# Patient Record
Sex: FEMALE | Race: WHITE | HISPANIC OR LATINO | Employment: FULL TIME | URBAN - METROPOLITAN AREA
[De-identification: names, ages, dates, MRNs, and addresses within clinical notes are randomized per-mention and may not be internally consistent; named-entity substitution may affect disease eponyms.]

---

## 2017-12-31 ENCOUNTER — HOSPITAL ENCOUNTER (EMERGENCY)
Facility: MEDICAL CENTER | Age: 22
End: 2017-12-31
Attending: EMERGENCY MEDICINE

## 2017-12-31 ENCOUNTER — APPOINTMENT (OUTPATIENT)
Dept: RADIOLOGY | Facility: MEDICAL CENTER | Age: 22
End: 2017-12-31
Attending: EMERGENCY MEDICINE

## 2017-12-31 VITALS
BODY MASS INDEX: 26.02 KG/M2 | HEIGHT: 63 IN | SYSTOLIC BLOOD PRESSURE: 109 MMHG | WEIGHT: 146.83 LBS | DIASTOLIC BLOOD PRESSURE: 74 MMHG | OXYGEN SATURATION: 98 % | TEMPERATURE: 97 F | RESPIRATION RATE: 16 BRPM | HEART RATE: 84 BPM

## 2017-12-31 DIAGNOSIS — O20.0 THREATENED MISCARRIAGE: ICD-10-CM

## 2017-12-31 LAB
ALBUMIN SERPL BCP-MCNC: 4 G/DL (ref 3.2–4.9)
ALBUMIN/GLOB SERPL: 1.3 G/DL
ALP SERPL-CCNC: 39 U/L (ref 30–99)
ALT SERPL-CCNC: 23 U/L (ref 2–50)
ANION GAP SERPL CALC-SCNC: 7 MMOL/L (ref 0–11.9)
AST SERPL-CCNC: 22 U/L (ref 12–45)
B-HCG SERPL-ACNC: ABNORMAL MIU/ML (ref 0–5)
BACTERIA GENITAL QL WET PREP: NORMAL
BASOPHILS # BLD AUTO: 0.3 % (ref 0–1.8)
BASOPHILS # BLD: 0.04 K/UL (ref 0–0.12)
BILIRUB SERPL-MCNC: 0.3 MG/DL (ref 0.1–1.5)
BUN SERPL-MCNC: 10 MG/DL (ref 8–22)
CALCIUM SERPL-MCNC: 9.7 MG/DL (ref 8.5–10.5)
CHLORIDE SERPL-SCNC: 104 MMOL/L (ref 96–112)
CO2 SERPL-SCNC: 23 MMOL/L (ref 20–33)
CREAT SERPL-MCNC: 0.53 MG/DL (ref 0.5–1.4)
EOSINOPHIL # BLD AUTO: 0.25 K/UL (ref 0–0.51)
EOSINOPHIL NFR BLD: 1.7 % (ref 0–6.9)
ERYTHROCYTE [DISTWIDTH] IN BLOOD BY AUTOMATED COUNT: 41.1 FL (ref 35.9–50)
GFR SERPL CREATININE-BSD FRML MDRD: >60 ML/MIN/1.73 M 2
GLOBULIN SER CALC-MCNC: 3.2 G/DL (ref 1.9–3.5)
GLUCOSE SERPL-MCNC: 70 MG/DL (ref 65–99)
HCT VFR BLD AUTO: 34.7 % (ref 37–47)
HGB BLD-MCNC: 11.8 G/DL (ref 12–16)
IMM GRANULOCYTES # BLD AUTO: 0.06 K/UL (ref 0–0.11)
IMM GRANULOCYTES NFR BLD AUTO: 0.4 % (ref 0–0.9)
LYMPHOCYTES # BLD AUTO: 2.05 K/UL (ref 1–4.8)
LYMPHOCYTES NFR BLD: 14 % (ref 22–41)
MCH RBC QN AUTO: 28.1 PG (ref 27–33)
MCHC RBC AUTO-ENTMCNC: 34 G/DL (ref 33.6–35)
MCV RBC AUTO: 82.6 FL (ref 81.4–97.8)
MONOCYTES # BLD AUTO: 0.66 K/UL (ref 0–0.85)
MONOCYTES NFR BLD AUTO: 4.5 % (ref 0–13.4)
NEUTROPHILS # BLD AUTO: 11.6 K/UL (ref 2–7.15)
NEUTROPHILS NFR BLD: 79.1 % (ref 44–72)
NRBC # BLD AUTO: 0 K/UL
NRBC BLD-RTO: 0 /100 WBC
NUMBER OF RH DOSES IND 8505RD: NORMAL
PLATELET # BLD AUTO: 340 K/UL (ref 164–446)
PMV BLD AUTO: 9.3 FL (ref 9–12.9)
POTASSIUM SERPL-SCNC: 3.3 MMOL/L (ref 3.6–5.5)
PROT SERPL-MCNC: 7.2 G/DL (ref 6–8.2)
RBC # BLD AUTO: 4.2 M/UL (ref 4.2–5.4)
RH BLD: NORMAL
SIGNIFICANT IND 70042: NORMAL
SITE SITE: NORMAL
SODIUM SERPL-SCNC: 134 MMOL/L (ref 135–145)
SOURCE SOURCE: NORMAL
WBC # BLD AUTO: 14.7 K/UL (ref 4.8–10.8)

## 2017-12-31 PROCEDURE — 99284 EMERGENCY DEPT VISIT MOD MDM: CPT

## 2017-12-31 PROCEDURE — 84702 CHORIONIC GONADOTROPIN TEST: CPT

## 2017-12-31 PROCEDURE — 76817 TRANSVAGINAL US OBSTETRIC: CPT

## 2017-12-31 PROCEDURE — 87591 N.GONORRHOEAE DNA AMP PROB: CPT

## 2017-12-31 PROCEDURE — 700105 HCHG RX REV CODE 258: Performed by: EMERGENCY MEDICINE

## 2017-12-31 PROCEDURE — 87491 CHLMYD TRACH DNA AMP PROBE: CPT

## 2017-12-31 PROCEDURE — 80053 COMPREHEN METABOLIC PANEL: CPT

## 2017-12-31 PROCEDURE — 85025 COMPLETE CBC W/AUTO DIFF WBC: CPT

## 2017-12-31 PROCEDURE — 86901 BLOOD TYPING SEROLOGIC RH(D): CPT

## 2017-12-31 PROCEDURE — 36415 COLL VENOUS BLD VENIPUNCTURE: CPT

## 2017-12-31 RX ORDER — SODIUM CHLORIDE 9 MG/ML
1000 INJECTION, SOLUTION INTRAVENOUS ONCE
Status: COMPLETED | OUTPATIENT
Start: 2017-12-31 | End: 2017-12-31

## 2017-12-31 RX ADMIN — SODIUM CHLORIDE 1000 ML: 9 INJECTION, SOLUTION INTRAVENOUS at 16:14

## 2017-12-31 ASSESSMENT — LIFESTYLE VARIABLES: DO YOU DRINK ALCOHOL: NO

## 2017-12-31 ASSESSMENT — PAIN SCALES - GENERAL: PAINLEVEL_OUTOF10: 0

## 2017-12-31 NOTE — ED NOTES
Pt BIB EMS from work.  Pt reports vaginal bleeding starting 30 mins ago and reports she is 11 wks pregnant.  Pt is mostly Divehi speaking.  Pt reports she saw a doctor in [place].  Pt denies clots or abd cramping.  ERP to see.

## 2017-12-31 NOTE — ED PROVIDER NOTES
"ED Provider Note    CHIEF COMPLAINT  Chief Complaint   Patient presents with   • Vaginal Bleeding   • Pregnancy     11 wks       HPI  Nurys Polanco is a 22 y.o. female who presentsTo the emergency department with vaginal bleeding. The patient states she is approximately 11 weeks pregnant and she did have an ultrasound in [place]. The patient states the bleeding started about 1-2 hours prior to arrival. She does not have any associated pain. She does not have any dizziness. She does not have any dysuria. She also denies vaginal discharge. She has not had any nausea or vomiting. The patient is otherwise healthy.    REVIEW OF SYSTEMS  See [facility] for further details. All other systems are negative.     PAST MEDICAL HISTORY  History reviewed. No pertinent past medical history.    SOCIAL HISTORY  Social History     Social History   • Marital status: N/A     Spouse name: N/A   • Number of children: N/A   • Years of education: N/A     Social History Main Topics   • Smoking status: Former Smoker     Quit date: 10/16/2017   • Smokeless tobacco: Never Used   • Alcohol use No   • Drug use: No   • Sexual activity: Not on file     Other Topics Concern   • Not on file     Social History Narrative   • No narrative on file           PHYSICAL EXAM  VITAL SIGNS: /74   Pulse 90   Temp 36.1 °C (97 °F)   Resp 16   Ht 1.6 m (5' 2.99\")   Wt 66.6 kg (146 lb 13.2 oz)   SpO2 99%   BMI 26.02 kg/m²   Constitutional: Well developed, Well nourished, No acute distress, Non-toxic appearance.   HENT: Normocephalic, Atraumatic, tympanic membranes are intact and nonerythematous bilaterally, Oropharynx moist without exudates or erythema, Nose normal.   Eyes: PERRLA, EOMI, Conjunctiva normal.  Neck: Supple without meningismus  Lymphatic: No lymphadenopathy noted.   Cardiovascular: Normal heart rate, Normal rhythm, No murmurs, No rubs, No gallops.   Thorax & Lungs: Normal breath sounds, No respiratory distress, No wheezing, No chest " tenderness.   Abdomen: Bowel sounds normal, Soft, No tenderness, no rebound, no guarding, no distention, No masses, No pulsatile masses.   Skin: Warm, Dry, No erythema, No rash.   Back: No tenderness, No CVA tenderness.   Genitalia: External genitalia appear normal, mild bleeding, no discharge, cervix is closed, no adnexal or uterine tenderness   Extremities: Atraumatic with symmetric distal pulses, No edema, No tenderness, No cyanosis, No clubbing.   Neurologic: Alert & oriented x 3, cranial nerves II through XII are intact, Normal motor function, Normal sensory function, No focal deficits noted.   Psychiatric: Affect normal, Judgment normal, Mood normal.     RADIOLOGY/PROCEDURES  US-OB PELVIS TRANSVAGINAL   Final Result      1.  12 week 1 day live intrauterine pregnancy      2.  Small implantational/subchorionic hemorrhage measuring 2.7 cm            COURSE & MEDICAL DECISION MAKING  Pertinent Labs & Imaging studies reviewed. (See chart for details)  This a 22-year-old female who presents to the emergency department with a threatened miscarriage. The ultrasound does show evidence of a 12 week 1 day intrauterine pregnancy. She does have a small subchorionic hemorrhage and I suspect this is the source of the vaginal bleeding. The patient is hemodynamically stable. She'll be discharged with pelvic rest instructions as well as instructions to follow up with a gynecologist for outpatient management. She will return to the emergency department for increased bleeding, pain, or dizziness.    FINAL IMPRESSION  1. Threatened miscarriage     Disposition  The patient will be discharged in stable condition    Electronically signed by: Marek Galvin, 12/31/2017 3:59 PM

## 2018-01-01 NOTE — DISCHARGE INSTRUCTIONS
"Amenaza de aborto  (Threatened Miscarriage)  La amenaza de aborto se produce cuando hay hemorragia vaginal valentine las primeras 20 semanas de embarazo, kailey el embarazo no se interrumpe. Si valentine bairon período usted tiene hemorragia vaginal, el médico le hará pruebas para asegurarse de que el embarazo continúe. Si las pruebas muestran que usted continúa embarazada y que el \"bebé\" en desarrollo (feto) dentro del útero sigue creciendo, se considera que tuvo meagan amenaza de aborto.  La amenaza de aborto no implica que el embarazo vaya a terminar, kailey sí aumenta el riesgo de perder el embarazo (aborto completo).  CAUSAS   Por lo general, no se conoce la causa de la amenaza de aborto. Si el resultado final es el aborto completo, la causa más frecuente es la cantidad anormal de cromosomas del feto. Los cromosomas son las estructuras internas de las células que contienen todo el material genético.  Algunas de las causas de hemorragia vaginal que no ocasionan un aborto incluyen:  · Las relaciones sexuales.  · Las infecciones.  · Los cambios hormonales normales valentine el embarazo.  · La hemorragia que se produce cuando el óvulo se implanta en el útero.  FACTORES DE RIESGO  Los factores de riesgo de hemorragia al principio del embarazo incluyen:  · Obesidad.  · Fumar.  · El consumo de cantidades excesivas de alcohol o cafeína.  · El consumo de drogas.  SIGNOS Y SÍNTOMAS  · Hemorragia vaginal leve.  · Dolor o cólicos abdominales leves.  DIAGNÓSTICO   Si tiene hemorragia con o sin dolor abdominal antes de las 20 semanas de embarazo, el médico le hará pruebas para determinar si el embarazo continúa. Meagan prueba importante incluye el uso de ondas sonoras y de meagan computadora (ecografía) para crear imágenes del interior del útero. Otras pruebas incluyen el examen interno de la vagina y el útero (examen pélvico), y el control de la frecuencia cardíaca del feto.   Es posible que le diagnostiquen meagan amenaza de aborto en los " siguientes casos:  · La ecografía muestra que el embarazo continúa.  · La frecuencia cardíaca del feto es iesha.  · El examen pélvico muestra que la apertura entre el útero y la vagina (kwame del útero) está cerrada.  · Cordon frecuencia cardíaca y cordon presión arterial están estables.  · Los análisis de minh confirman que el embarazo continúa.  TRATAMIENTO   No se ha demostrado que ningún tratamiento evite que meagan amenaza de aborto se convierta en un aborto completo. Sin embargo, los cuidados adecuados en el hogar son importantes.   INSTRUCCIONES PARA EL CUIDADO EN EL HOGAR   · Asegúrese de asistir a todas las citas de cuidados prenatales. [place] es muy importante.  · Descanse lo suficiente.  · No tenga relaciones sexuales ni use tampones si tiene hemorragia vaginal.  · No se nancy duchas vaginales.  · No fume ni consuma drogas.  · No sonja alcohol.  · Evite la cafeína.  SOLICITE ATENCIÓN MÉDICA SI:  · Tiene meagan ligera hemorragia o manchado vaginal valentine el embarazo.  · Tiene dolor o cólicos en el abdomen.  · Tiene fiebre.  SOLICITE ATENCIÓN MÉDICA DE INMEDIATO SI:  · Tiene meagan hemorragia vaginal abundante.  · Elimina coágulos de minh por la vagina.  · Siente dolor en la parte baja de la espalda o cólicos abdominales intensos.  · Tiene fiebre, escalofríos y dolor abdominal intenso.  ASEGÚRESE DE QUE:  · Comprende estas instrucciones.  · Controlará cordon afección.  · Recibirá ayuda de inmediato si no mejora o si empeora.     Esta información no tiene lorie fin reemplazar el consejo del médico. Asegúrese de hacerle al médico cualquier pregunta que tenga.     Document Released: 09/27/2006 Document Revised: 12/23/2014  [employer]vier Interactive Patient Education ©2016 [employer]

## 2018-01-01 NOTE — ED NOTES
Discharge instructions given with use of .  All questions answered.  Pt to follow-up with OB/GYN.  Pt verbalized understanding.  All belongings with pt.  Pt ambulated to lobby.

## 2018-01-02 LAB
C TRACH DNA SPEC QL NAA+PROBE: NEGATIVE
N GONORRHOEA DNA SPEC QL NAA+PROBE: NEGATIVE
SPECIMEN SOURCE: NORMAL